# Patient Record
Sex: FEMALE | Race: WHITE | URBAN - METROPOLITAN AREA
[De-identification: names, ages, dates, MRNs, and addresses within clinical notes are randomized per-mention and may not be internally consistent; named-entity substitution may affect disease eponyms.]

---

## 2017-03-29 ENCOUNTER — OFFICE VISIT (OUTPATIENT)
Dept: URGENT CARE | Facility: URGENT CARE | Age: 27
End: 2017-03-29
Payer: COMMERCIAL

## 2017-03-29 VITALS
SYSTOLIC BLOOD PRESSURE: 108 MMHG | DIASTOLIC BLOOD PRESSURE: 68 MMHG | OXYGEN SATURATION: 100 % | HEART RATE: 73 BPM | BODY MASS INDEX: 22.16 KG/M2 | HEIGHT: 63 IN | TEMPERATURE: 98.3 F | WEIGHT: 125.1 LBS

## 2017-03-29 DIAGNOSIS — R52 BODY ACHES: Primary | ICD-10-CM

## 2017-03-29 DIAGNOSIS — R07.0 THROAT PAIN: ICD-10-CM

## 2017-03-29 LAB
DEPRECATED S PYO AG THROAT QL EIA: NORMAL
FLUAV+FLUBV AG SPEC QL: NEGATIVE
FLUAV+FLUBV AG SPEC QL: NORMAL
MICRO REPORT STATUS: NORMAL
SPECIMEN SOURCE: NORMAL
SPECIMEN SOURCE: NORMAL

## 2017-03-29 PROCEDURE — 87880 STREP A ASSAY W/OPTIC: CPT | Performed by: FAMILY MEDICINE

## 2017-03-29 PROCEDURE — 99202 OFFICE O/P NEW SF 15 MIN: CPT | Performed by: FAMILY MEDICINE

## 2017-03-29 PROCEDURE — 87804 INFLUENZA ASSAY W/OPTIC: CPT | Performed by: FAMILY MEDICINE

## 2017-03-29 PROCEDURE — 87081 CULTURE SCREEN ONLY: CPT | Performed by: FAMILY MEDICINE

## 2017-03-29 RX ORDER — NORETHINDRONE ACETATE 5 MG
5 TABLET ORAL DAILY
COMMUNITY

## 2017-03-29 NOTE — NURSING NOTE
"Chief Complaint   Patient presents with     Urgent Care     Flu     chills, boday aches      Letter for School/Work     requested a work note.      Pharyngitis     sore throat        Initial /68 (BP Location: Right arm, Patient Position: Chair, Cuff Size: Adult Regular)  Pulse 73  Temp 98.3  F (36.8  C) (Oral)  Ht 5' 3\" (1.6 m)  Wt 125 lb 1.6 oz (56.7 kg)  SpO2 100%  BMI 22.16 kg/m2 Estimated body mass index is 22.16 kg/(m^2) as calculated from the following:    Height as of this encounter: 5' 3\" (1.6 m).    Weight as of this encounter: 125 lb 1.6 oz (56.7 kg).  Medication Reconciliation: complete    "

## 2017-03-29 NOTE — MR AVS SNAPSHOT
"              After Visit Summary   3/29/2017    Ria Castro    MRN: [id number]           Patient Information     Date Of Birth          1990        Visit Information        Provider Department      3/29/2017 11:45 AM Srinath Stoddard, DO [facility]        Today's Diagnoses     Body aches    -  1    Throat pain           Follow-ups after your visit        Who to contact     If you have questions or need follow up information about today's clinic visit or your schedule please contact [facility] directly at [phone].  Normal or non-critical lab and imaging results will be communicated to you by Vponhart, letter or phone within 4 business days after the clinic has received the results. If you do not hear from us within 7 days, please contact the clinic through Vponhart or phone. If you have a critical or abnormal lab result, we will notify you by phone as soon as possible.  Submit refill requests through Zaranga or call your pharmacy and they will forward the refill request to us. Please allow 3 business days for your refill to be completed.          Additional Information About Your Visit        MyChart Information     Zaranga lets you send messages to your doctor, view your test results, renew your prescriptions, schedule appointments and more. To sign up, go to [email or web address] . Click on \"Log in\" on the left side of the screen, which will take you to the Welcome page. Then click on \"Sign up Now\" on the right side of the page.     You will be asked to enter the access code listed below, as well as some personal information. Please follow the directions to create your username and password.     Your access code is: [email or web address]  Expires: 2017 12:47 PM     Your access code will  in 90 days. If you need help or a new code, please call your [place] clinic or [phone].        Care EveryWhere ID     This is your Care EveryWhere ID. " "This could be used by other organizations to access your [facility] medical records  [id number]        Your Vitals Were     Pulse Temperature Height Pulse Oximetry BMI (Body Mass Index)       73 98.3  F (36.8  C) (Oral) 5' 3\" (1.6 m) 100% 22.16 kg/m2        Blood Pressure from Last 3 Encounters:   03/29/17 108/68    Weight from Last 3 Encounters:   03/29/17 125 lb 1.6 oz (56.7 kg)              We Performed the Following     Beta strep group A culture     Influenza A/B antigen     Rapid strep screen        Primary Care Provider    None Specified       No primary provider on file.        Thank you!     Thank you for choosing [facility]  for your care. Our goal is always to provide you with excellent care. Hearing back from our patients is one way we can continue to improve our services. Please take a few minutes to complete the written survey that you may receive in the mail after your visit with us. Thank you!             Your Updated Medication List - Protect others around you: Learn how to safely use, store and throw away your medicines at www.disposemymeds.org.          This list is accurate as of: 3/29/17 12:47 PM.  Always use your most recent med list.                   Brand Name Dispense Instructions for use    HYDROXYZINE HCL PO      Take 25 mg by mouth as needed for itching       norethindrone 5 MG tablet    AYGESTIN     Take 5 mg by mouth daily         "

## 2017-03-29 NOTE — PROGRESS NOTES
"SUBJECTIVE: Ria Castro is a 26 year old female presenting with a chief complaint of fever, nasal congestion, cough  and sore throat.  Onset of symptoms was 1 day(s) ago.  Course of illness is same.    Severity moderate  Current and Associated symptoms: \"cold symptoms\"  Treatment measures tried include OTC meds.  Predisposing factors include None.    No past medical history on file.  Allergies   Allergen Reactions     Seasonal Allergies Hives     Social History   Substance Use Topics     Smoking status: Current Every Day Smoker     Smokeless tobacco: Not on file     Alcohol use Not on file       ROS:  SKIN: no rash  GI: no vomiting    OBJECTIVE:  /68 (BP Location: Right arm, Patient Position: Chair, Cuff Size: Adult Regular)  Pulse 73  Temp 98.3  F (36.8  C) (Oral)  Ht 5' 3\" (1.6 m)  Wt 125 lb 1.6 oz (56.7 kg)  SpO2 100%  BMI 22.16 kg/e2DERHAKR APPEARANCE: healthy, alert and no distress  EYES: EOMI,  PERRL, conjunctiva clear  HENT: ear canals and TM's normal.  Nose and mouth without ulcers, erythema or lesions  NECK: supple, nontender, no lymphadenopathy  RESP: lungs clear to auscultation - no rales, rhonchi or wheezes  SKIN: no suspicious lesions or rashes      ICD-10-CM    1. Body aches R52 Influenza A/B antigen     Beta strep group A culture   2. Throat pain R07.0 Rapid strep screen     Beta strep group A culture       Fluids/Rest, f/u if worse/not any better    "

## 2017-03-31 LAB
BACTERIA SPEC CULT: NORMAL
MICRO REPORT STATUS: NORMAL
SPECIMEN SOURCE: NORMAL

## 2018-02-04 NOTE — LETTER
[facility] [address]  [address]  Phone: [phone]     March 29, 2017      RE: Ria Castro  [address]  [place] [address]       To whom it may concern:    Ria Castro was seen in our clinic today. She  may return to work on 03/29/2017.      Sincerely,    Comfort AFIA Macias MD     
none

## 2021-04-27 ENCOUNTER — RX ONLY (RX ONLY)
Age: 31
End: 2021-04-27

## 2021-04-27 ENCOUNTER — OTHER (OUTPATIENT)
Dept: URBAN - METROPOLITAN AREA CLINIC 19 | Facility: CLINIC | Age: 31
Setting detail: DERMATOLOGY
End: 2021-04-27

## 2021-04-27 DIAGNOSIS — L57.8 OTHER SKIN CHANGES DUE TO CHRONIC EXPOSURE TO NONIONIZING RADIATION: ICD-10-CM

## 2021-04-27 DIAGNOSIS — L30.9 DERMATITIS, UNSPECIFIED: ICD-10-CM

## 2021-04-27 PROBLEM — L21.8 OTHER SEBORRHEIC DERMATITIS: Status: RESOLVED | Noted: 2021-04-27

## 2021-04-27 PROCEDURE — 99204 OFFICE O/P NEW MOD 45 MIN: CPT

## 2021-04-27 RX ORDER — KETOCONAZOLE 20 MG/ML
SHAMPOO, SUSPENSION TOPICAL
Qty: 120 | Refills: 3
Start: 2021-04-27

## 2021-04-27 RX ORDER — CLOBETASOL PROPIONATE 0.5 MG/ML
1 A SMALL AMOUNT SOLUTION TOPICAL TWICE A DAY
Qty: 50 | Refills: 1
Start: 2021-04-27

## 2021-06-18 ENCOUNTER — FOLLOW-UP (OUTPATIENT)
Dept: URBAN - METROPOLITAN AREA CLINIC 19 | Facility: CLINIC | Age: 31
Setting detail: DERMATOLOGY
End: 2021-06-18

## 2021-06-18 DIAGNOSIS — L30.9 DERMATITIS, UNSPECIFIED: ICD-10-CM

## 2021-06-18 PROCEDURE — 11102 TANGNTL BX SKIN SINGLE LES: CPT

## 2021-06-18 PROCEDURE — 11103 TANGNTL BX SKIN EA SEP/ADDL: CPT

## 2021-06-18 PROCEDURE — 99213 OFFICE O/P EST LOW 20 MIN: CPT

## 2022-09-26 ENCOUNTER — TELEHEALTH PROVIDED OTHER THAN IN PATIENT'S HOME (OUTPATIENT)
Dept: URBAN - METROPOLITAN AREA TELEHEALTH 7 | Facility: TELEHEALTH | Age: 32
End: 2022-09-26

## 2022-09-26 VITALS — WEIGHT: 145 LBS | HEIGHT: 63 IN

## 2022-09-26 DIAGNOSIS — K30 FUNCTIONAL DYSPEPSIA: ICD-10-CM

## 2022-09-26 DIAGNOSIS — R19.4 CHANGE IN BOWEL HABIT: ICD-10-CM

## 2022-09-26 PROCEDURE — 99204 OFFICE O/P NEW MOD 45 MIN: CPT | Mod: 95

## 2022-09-26 NOTE — SERVICEHPINOTES
PATIENT VERIFIED BY DATE OF BIRTH AND NAME. Patient has been consented for this telecommunication visit.
steven harris
30 y/o female seeking treatment for h. pylori. Patient reports PCP ordered blood test (+) on 9/9/2022. She was prescribed amoxicillin and levofloaxacin but she did not start the treatment as she had concerns about levofloaxacin side effects. When she brought this up to her primary, she was referred to GI.
steven
Patient notes she has been experiencing post-prandial bloating and nausea as well as intermittent vomiting for several months. There is a hx of acid reflux. Takes omeprazole 40 mg for a couple of years. This helps heartburn but doesn't seem to affect the other symptoms. She also c/o "weakness that comes on suddenly" at random times during the day. Recent labs reportedly unremarkable. Her bowel habits are irregular, frequency varies but most often 2-3x/day with loose-watery stools has been ongoing at least since 2019. She had a colonoscopy "normal" in 2019 in [place] for this reason. No hematochezia or melena. Denies unintentional weight loss, fevers, chills, night sweats.steven

## 2022-09-26 NOTE — SERVICENOTES
Patient's visit was conducted through video telecommunication. Patient consented before the start of visit as to understanding of privacy concerns, possible technological failure, and their responsibility of carrying out instructions of plan.  Provider was located in their home during this visit. 

I have reviewed the history, physical exam, assessment and management plans.  I concur with or have edited all elements of her note.

## 2022-10-15 LAB
H PYLORI BREATH TEST: NEGATIVE
H. PYLORI BREATH COLLECTION: (no result)

## 2023-02-20 ENCOUNTER — OFFICE (OUTPATIENT)
Dept: URBAN - METROPOLITAN AREA TELEHEALTH 12 | Facility: TELEHEALTH | Age: 33
End: 2023-02-20

## 2023-02-20 VITALS — HEIGHT: 63 IN | WEIGHT: 145 LBS

## 2023-02-20 DIAGNOSIS — R19.4 CHANGE IN BOWEL HABIT: ICD-10-CM

## 2023-02-20 DIAGNOSIS — K30 FUNCTIONAL DYSPEPSIA: ICD-10-CM

## 2023-02-20 DIAGNOSIS — R14.0 ABDOMINAL DISTENSION (GASEOUS): ICD-10-CM

## 2023-02-20 PROCEDURE — 99215 OFFICE O/P EST HI 40 MIN: CPT

## 2023-02-20 RX ORDER — PANTOPRAZOLE 40 MG/1
TABLET, DELAYED RELEASE ORAL
Qty: 90 | Refills: 2 | Status: ACTIVE

## 2023-04-01 LAB
CELIAC DISEASE COMPREHENSIVE: DEAMIDATED GLIADIN ABS, IGA: 3 UNITS (ref 0–19)
CELIAC DISEASE COMPREHENSIVE: DEAMIDATED GLIADIN ABS, IGG: 2 UNITS (ref 0–19)
CELIAC DISEASE COMPREHENSIVE: ENDOMYSIAL ANTIBODY IGA: NEGATIVE
CELIAC DISEASE COMPREHENSIVE: IMMUNOGLOBULIN A, QN, SERUM: 103 MG/DL (ref 87–352)
CELIAC DISEASE COMPREHENSIVE: T-TRANSGLUTAMINASE (TTG) IGA: <2 U/ML
CELIAC DISEASE COMPREHENSIVE: T-TRANSGLUTAMINASE (TTG) IGG: <2 U/ML

## 2024-03-07 ENCOUNTER — TELEHEALTH PROVIDED OTHER THAN IN PATIENT'S HOME (OUTPATIENT)
Dept: URBAN - METROPOLITAN AREA TELEHEALTH 3 | Facility: TELEHEALTH | Age: 34
End: 2024-03-07

## 2024-03-07 VITALS — HEIGHT: 63 IN | WEIGHT: 140 LBS

## 2024-03-07 DIAGNOSIS — K62.5 HEMORRHAGE OF ANUS AND RECTUM: ICD-10-CM

## 2024-03-07 DIAGNOSIS — K21.9 GASTRO-ESOPHAGEAL REFLUX DISEASE WITHOUT ESOPHAGITIS: ICD-10-CM

## 2024-03-07 PROCEDURE — 99214 OFFICE O/P EST MOD 30 MIN: CPT | Mod: 95 | Performed by: PHYSICIAN ASSISTANT

## 2024-03-07 NOTE — SERVICEHPINOTES
PATIENT VERIFIED BY DATE OF BIRTH AND NAME. Patient has been consented for this telecommunication visit.   Pt is here for f/u regarding GERD. She's been on Pantoprazole for about 2 yrs and prior to this, was on Omeprazole for a few yrs. The PPI works well for her but she is unable to come off of it as symptoms occur immediately. No melena but  did have an episode of hematemesis in November. Did see some blood per rectum recently but it occurs rarely. We discussed role for a colonoscopy to r/o CRC and other pathology but she declines for now but will call back to schedule a colonoscopy if she sees more blood. Pt does take NSAIDS frequently. The pt states previous h pylori testing was neg. She denies dysphagia, anorexia, early satiety, melena, unexplained wt loss, etc. No other GI related complaints today. ROS as per HPI and o/w is unremarkable

## 2024-03-07 NOTE — SERVICENOTES
Patient's visit was conducted through [employer] video telecommunication. Patient consented before the start of visit as to understanding of privacy concerns, possible technological failure, and their responsibility of carrying out instructions of plan.